# Patient Record
Sex: FEMALE | Race: WHITE | NOT HISPANIC OR LATINO | URBAN - NONMETROPOLITAN AREA
[De-identification: names, ages, dates, MRNs, and addresses within clinical notes are randomized per-mention and may not be internally consistent; named-entity substitution may affect disease eponyms.]

---

## 2017-01-18 NOTE — PATIENT DISCUSSION
11 16 16 LESS VA, MILD EDEMA, RECOM RETX OZURDEX GIVEN DROP IN VA, AND IOP IN 4 WKS, THEN REEVAL 2 MONTHS LATER

## 2017-01-18 NOTE — PATIENT DISCUSSION
REVIEWED OPTIINS A/L/E AND [facility] PREFERENCE FOR AVASTIN, AND PT WANTS TO PROCEED WITH LUCENTIS TREATMENT

## 2017-01-18 NOTE — PATIENT DISCUSSION
8 3 16 ^EDEMA, DECREASED VA 20/50 WITH NEAR CARD. REVIEWED OPTIONS SHORTER F/U VS TRIAL WITH OZURDEX. PT WANTS TO PROCEED WITH OZURDEX AND ACCEPTS RISK OF ^ IOP.

## 2017-01-18 NOTE — PATIENT DISCUSSION
Based on today’s exam, diagnostic studies and review of the records, the determination was made for OZURDEX 0.7MG treatment WITHIN 2-3 WEEKS

## 2017-03-08 NOTE — PATIENT DISCUSSION
OZURDEX - MILD EDEMA AT 3 MONTHS SP OZURDEX WITH REDUCTION IN [place] SINCE Valentino Sermons

## 2017-03-15 NOTE — PROCEDURE NOTE: CLINICAL
PROCEDURE NOTE: Intravitreal Ozurdex OD. Diagnosis: Branch Retinal Vein Occlusion with Macular Edema. Anesthesia: Subconjunctival. Prep: Betadine Flush. Prior to injection, risks/benefits/alternatives discussed including infection, loss of vision, hemorrhage, cataract, glaucoma, retinal tears or detachment and patient wished to proceed. The patient was seated in the examination chair. Topical anesthesia was induced with Proparicaine 0.5%. Subconjunctival xylocaine 2% approximately 0.5 cc was given for anesthesia. Betadine Prep was performed. The Ozurdex drug implant (dexamethasone 0.7 mg intravitreal implant) was injected into the vitreous cavity. The needle was passed 3.0 mm posterior to the limbus in pseudophakic patients, and 3.5 mm posterior to the limbus in phakic patients. The eye was stabilized using a q tip or cotton tip applicator, and the conjunctiva was displaced from the injection site. The remainder of the intravitreal Ozurdex in the single-use vial was then discarded in a medical waste disposal container. The implant settled inferiorly. The retina was examined following the injection. There was no evidence of hemorrhage, subretinal injection, or retinal detachment. Patient tolerated procedure well. There were no complications. Post-op instructions given. Lot # am. Expiration date: */*. Inventory Id: *. The patient was instructed of a follow up appointment in approximately 6 weeks for a limited exam and pressure check and was told to call immediately if the vision decreases and/or if the patient’s eye becomes red, painful, and/or light sensitive. If the patient is unable to reach the doctor within an hour or two, the patient is instructed to go to the emergency room or call 911. The patient was instructed to use Artificial Tears q.i.d. p.r.n for comfort. Can Vera

## 2017-05-31 NOTE — PATIENT DISCUSSION
11 16 16 LESS VA, MILD EDEMA, RECOM RETX OZURDEX GIVEN DROP IN VA, AND IOP IN 4 WKS, THEN REEVAL 2 MONTHS LATER.

## 2017-08-02 NOTE — PATIENT DISCUSSION
MILD EDEMA AT 5 MONTHS S/P LAST OZURDEX - NO PROGRESSION ON OCT OFF MEDS - TO FOLLOW AND SEE HOW SHE CONTINUES TO DO OFF MEDS.

## 2017-11-01 NOTE — PATIENT DISCUSSION
OZURDEX DONE 3 8 17 - MILD EDEMA AT 3 MONTHS SP OZURDEX WITH REDUCTION IN [address] SINCE JAN.

## 2017-11-01 NOTE — PATIENT DISCUSSION
MILD EDEMA - MILD INCREASE - NO REDUCTION IN VA - DISCUSSED WITH PT - TO HOLD ON TX AND REEVAL - IF FURTHER ^ EDEMA IT MAY BE NECESSARY TO [address] [place] [address].

## 2018-01-31 NOTE — PATIENT DISCUSSION
LAST OZURDEX  3 8 17 - MILD EDEMA AT 3 MONTHS SP OZURDEX WITH REDUCTION IN [place] SINCE JAN.

## 2018-12-05 PROBLEM — E11.3293: Noted: 2018-12-05

## 2018-12-05 PROBLEM — H16.223: Noted: 2020-12-10

## 2018-12-05 PROBLEM — E11.9: Noted: 2018-12-05

## 2019-09-18 NOTE — PATIENT DISCUSSION
LAST OZURDEX  3 8 17 - MILD EDEMA AT 3 MONTHS SP OZURDEX WITH REDUCTION IN [facility] SINCE JAN.

## 2019-09-18 NOTE — PROCEDURE NOTE: CLINICAL
PROCEDURE NOTE: Intravitreal Ozurdex OD. Diagnosis: Branch Retinal Vein Occlusion with Macular Edema. Anesthesia: Topical/Subconjunctival. Prep: Betadine Flush. Prior to injection, risks/benefits/alternatives discussed including infection, loss of vision, hemorrhage, cataract, glaucoma, retinal tears or detachment and patient wished to proceed. The patient was seated in the examination chair. Topical anesthesia was induced with Proparicaine 0.5%. Subconjunctival xylocaine 2% approximately 0.5 cc was given for anesthesia. Betadine Prep was performed. The Ozurdex drug implant (dexamethasone 0.7 mg intravitreal implant) was injected into the vitreous cavity. The needle was passed 3.0 mm posterior to the limbus in pseudophakic patients, and 3.5 mm posterior to the limbus in phakic patients. The eye was stabilized using a q tip or cotton tip applicator, and the conjunctiva was displaced from the injection site. The remainder of the intravitreal Ozurdex in the single-use vial was then discarded in a medical waste disposal container. The implant settled inferiorly. The retina was examined following the injection. There was no evidence of hemorrhage, subretinal injection, or retinal detachment. Patient tolerated procedure well. There were no complications. Post-op instructions given. Lot # *. Expiration date: */*. The patient was instructed of a follow up appointment in approximately 6 weeks for a limited exam and pressure check and was told to call immediately if the vision decreases and/or if the patient’s eye becomes red, painful, and/or light sensitive. If the patient is unable to reach the doctor within an hour or two, the patient is instructed to go to the emergency room or call 911. Inventory Id: *. The patient was instructed to use Artificial Tears q.i.d. p.r.n for comfort. Wilman Chavarria

## 2019-12-05 ENCOUNTER — IMPORTED ENCOUNTER (OUTPATIENT)
Dept: URBAN - NONMETROPOLITAN AREA CLINIC 1 | Facility: CLINIC | Age: 74
End: 2019-12-05

## 2019-12-05 PROCEDURE — 92014 COMPRE OPH EXAM EST PT 1/>: CPT

## 2019-12-05 NOTE — PATIENT DISCUSSION
Type II DM w/ mild Retinopathy-  discussed findings with pt -  mild diabetic retinopathy seen today -  pt diagnosed in 2005 condition is controlled with oral medication-  A1C checked in November number unknown-  BS-not every day last checked yesterday 137 fasting-  will send notes to Dr. Alejandro Mccauley-  continue to monitor yearly or prnP/C IOL w/ PCO OD-  discussed findings with pt -  PCO noted OD open OS-  no treatment indicated at this time patient is not having any symptoms -  continue to monitor; Dr's Notes: Amoron Quails

## 2019-12-18 NOTE — PROCEDURE NOTE: CLINICAL
PROCEDURE NOTE: Intravitreal Ozurdex OD. Diagnosis: Branch Retinal Vein Occlusion with Macular Edema. Anesthesia: Subconjunctival. Prep: Betadine Flush. Prior to injection, risks/benefits/alternatives discussed including infection, loss of vision, hemorrhage, cataract, glaucoma, retinal tears or detachment and patient wished to proceed. The patient was seated in the examination chair. Topical anesthesia was induced with Proparicaine 0.5%. Subconjunctival xylocaine 2% approximately 0.5 cc was given for anesthesia. Betadine Prep was performed. The Ozurdex drug implant (dexamethasone 0.7 mg intravitreal implant) was injected into the vitreous cavity. The needle was passed 3.0 mm posterior to the limbus in pseudophakic patients, and 3.5 mm posterior to the limbus in phakic patients. The eye was stabilized using a q tip or cotton tip applicator, and the conjunctiva was displaced from the injection site. The remainder of the intravitreal Ozurdex in the single-use vial was then discarded in a medical waste disposal container. The implant settled inferiorly. The retina was examined following the injection. There was no evidence of hemorrhage, subretinal injection, or retinal detachment. Patient tolerated procedure well. There were no complications. Post-op instructions given. Lot # *. Expiration date: */*. The patient was instructed of a follow up appointment in approximately 6 weeks for a limited exam and pressure check and was told to call immediately if the vision decreases and/or if the patient’s eye becomes red, painful, and/or light sensitive. If the patient is unable to reach the doctor within an hour or two, the patient is instructed to go to the emergency room or call 911. Inventory Id: *. The patient was instructed to use Artificial Tears q.i.d. p.r.n for comfort. Gaurav Wu

## 2019-12-23 NOTE — PROCEDURE NOTE: CLINICAL
PROCEDURE NOTE: Intravitreal Vancomycin/Ceftazidime OD. Diagnosis: Endophthalmitis. Anesthesia: Topical. Prep: Betadine Flush. Prior to injection, risks/benefits/alternatives discussed including infection, loss of vision, hemorrhage, cataract, retinal tears or detachment and patient wished to proceed. All questions asked by the patient were answered in detail. Betadine prep was performed. Intraocular Injection of Vancomycin (1.0 mg/0.1ml) and Ceftazidime (2.25/0.1ml) was given. Injection site: 3-4 mm from the limbus. Patient tolerated procedure well. There were no complications. Central retinal artery was perfused after injection. Post procedure antibiotics were placed on the eye. Post injection IOP = 10 mmHg. Post-op instructions given. Patient given office phone number/answering service number and advised to call immediately should there be an increase in floaters or redness, loss of vision or pain, or should they have any other questions or concerns. Harden Salon PROCEDURE NOTE: Subconjunctival Vancomycin OD. Diagnosis: Branch Retinal Vein Occlusion with Macular Edema. Anesthesia: Lidocaine 4%. Prior to procedure, risks/benefits/alternatives discussed including infection, loss of vision, hemorrhage, cataract, glaucoma, retinal tears or detachment. The patient wished to proceed with procedure. Subconjunctival injection of Vancomycin 20 [id number] was given. Location of injection: INFERIOR*. Time of injection: 1PM*. There were no complications. Patient tolerated procedure well. Post-op instructions given. Harden Salon PROCEDURE NOTE: Subconjunctival Ceftazidime OD. Diagnosis: Branch Retinal Vein Occlusion with Macular Edema. Anesthesia: Lidocaine 4%. Prior to procedure, risks/benefits/alternatives discussed including infection, loss of vision, hemorrhage, cataract, glaucoma, retinal tears or detachment. The patient wished to proceed with procedure. Subconjunctival injection of Ceftazidime 10MG * was given. Location of injection:INFERIOR *. Time of injection: 1PM*. There were no complications. Patient tolerated procedure well. Post-op instructions given. Harden Salon PROCEDURE NOTE: A/C Paracentesis, Diagnostic OD. Diagnosis: Endophthalmitis. Prior to procedure, risks/benefits/alternatives discussed including infection, loss of vision, hemorrhage, cataract, retinal tears or detachment and patient wished to proceed. All questions asked by the patient were answered in detail. Betadine prep was performed. Location of Paracentesis: Temporal Limbus. Volume of tap: 0.* ml. IOP after procedure was * mmHg. Patient tolerated procedure well. There were no complications. Post procedure instructions given. Patient given office phone number/answering service number and advised to call immediately should there be an increase in floaters or redness, loss of vision or pain, or should they have any other questions or concerns. Crista Lieberman

## 2019-12-23 NOTE — PROCEDURE NOTE: CLINICAL
PROCEDURE NOTE: Intravitreal Vancomycin/Ceftazidime OD. Diagnosis: Endophthalmitis. Anesthesia: Topical. Prep: Betadine Flush. Prior to injection, risks/benefits/alternatives discussed including infection, loss of vision, hemorrhage, cataract, retinal tears or detachment and patient wished to proceed. All questions asked by the patient were answered in detail. Betadine prep was performed. Intraocular Injection of Vancomycin (1.0 mg/0.1ml) and Ceftazidime (2.25/0.1ml) was given. Injection site: 3-4 mm from the limbus. Patient tolerated procedure well. There were no complications. Central retinal artery was perfused after injection. Post procedure antibiotics were placed on the eye. Post injection IOP = 10 mmHg. Post-op instructions given. Patient given office phone number/answering service number and advised to call immediately should there be an increase in floaters or redness, loss of vision or pain, or should they have any other questions or concerns. Harden Salon PROCEDURE NOTE: Subconjunctival Vancomycin OD. Diagnosis: Branch Retinal Vein Occlusion with Macular Edema. Anesthesia: Lidocaine 4%. Prior to procedure, risks/benefits/alternatives discussed including infection, loss of vision, hemorrhage, cataract, glaucoma, retinal tears or detachment. The patient wished to proceed with procedure. Subconjunctival injection of Vancomycin 20 [id number] was given. Location of injection: INFERIOR*. Time of injection: 1PM*. There were no complications. Patient tolerated procedure well. Post-op instructions given. Harden Salon PROCEDURE NOTE: Subconjunctival Ceftazidime OD. Diagnosis: Branch Retinal Vein Occlusion with Macular Edema. Anesthesia: Lidocaine 4%. Prior to procedure, risks/benefits/alternatives discussed including infection, loss of vision, hemorrhage, cataract, glaucoma, retinal tears or detachment. The patient wished to proceed with procedure. Subconjunctival injection of Ceftazidime 10MG * was given. Location of injection:INFERIOR *. Time of injection: 1PM*. There were no complications. Patient tolerated procedure well. Post-op instructions given. Harden Salon PROCEDURE NOTE: A/C Paracentesis, Diagnostic OD. Diagnosis: Endophthalmitis. Prior to procedure, risks/benefits/alternatives discussed including infection, loss of vision, hemorrhage, cataract, retinal tears or detachment and patient wished to proceed. All questions asked by the patient were answered in detail. Betadine prep was performed. Location of Paracentesis: Temporal Limbus. Volume of tap: 0.* ml. IOP after procedure was * mmHg. Patient tolerated procedure well. There were no complications. Post procedure instructions given. Patient given office phone number/answering service number and advised to call immediately should there be an increase in floaters or redness, loss of vision or pain, or should they have any other questions or concerns. Crista Lieberman

## 2019-12-23 NOTE — PROCEDURE NOTE: CLINICAL
PROCEDURE NOTE: Intravitreal Vancomycin/Ceftazidime OD. Diagnosis: Endophthalmitis. Anesthesia: Topical. Prep: Betadine Flush. Prior to injection, risks/benefits/alternatives discussed including infection, loss of vision, hemorrhage, cataract, retinal tears or detachment and patient wished to proceed. All questions asked by the patient were answered in detail. Betadine prep was performed. Intraocular Injection of Vancomycin (1.0 mg/0.1ml) and Ceftazidime (2.25/0.1ml) was given. Injection site: 3-4 mm from the limbus. Patient tolerated procedure well. There were no complications. Central retinal artery was perfused after injection. Post procedure antibiotics were placed on the eye. Post injection IOP = 10 mmHg. Post-op instructions given. Patient given office phone number/answering service number and advised to call immediately should there be an increase in floaters or redness, loss of vision or pain, or should they have any other questions or concerns. Harden Salon PROCEDURE NOTE: Subconjunctival Vancomycin OD. Diagnosis: Branch Retinal Vein Occlusion with Macular Edema. Anesthesia: Lidocaine 4%. Prior to procedure, risks/benefits/alternatives discussed including infection, loss of vision, hemorrhage, cataract, glaucoma, retinal tears or detachment. The patient wished to proceed with procedure. Subconjunctival injection of Vancomycin 20 [id number] was given. Location of injection: INFERIOR*. Time of injection: 1PM*. There were no complications. Patient tolerated procedure well. Post-op instructions given. Harden Salon PROCEDURE NOTE: Subconjunctival Ceftazidime OD. Diagnosis: Branch Retinal Vein Occlusion with Macular Edema. Anesthesia: Lidocaine 4%. Prior to procedure, risks/benefits/alternatives discussed including infection, loss of vision, hemorrhage, cataract, glaucoma, retinal tears or detachment. The patient wished to proceed with procedure. Subconjunctival injection of Ceftazidime 10MG * was given. Location of injection:INFERIOR *. Time of injection: 1PM*. There were no complications. Patient tolerated procedure well. Post-op instructions given. Harden Salon PROCEDURE NOTE: A/C Paracentesis, Diagnostic OD. Diagnosis: Endophthalmitis. Prior to procedure, risks/benefits/alternatives discussed including infection, loss of vision, hemorrhage, cataract, retinal tears or detachment and patient wished to proceed. All questions asked by the patient were answered in detail. Betadine prep was performed. Location of Paracentesis: Temporal Limbus. Volume of tap: 0.* ml. IOP after procedure was * mmHg. Patient tolerated procedure well. There were no complications. Post procedure instructions given. Patient given office phone number/answering service number and advised to call immediately should there be an increase in floaters or redness, loss of vision or pain, or should they have any other questions or concerns. Crista Lieberman

## 2020-01-02 NOTE — PATIENT DISCUSSION
CONTINUES TO IMPROVE 1 2 2020 - CULT NEG - AC CLEAR - STILL VIT CELLS - CONT PF QID AND RE-EVAL IN 2 WKS.

## 2020-03-05 NOTE — PATIENT DISCUSSION
REVIEWED OPTIONS - TX VS OBSERVATION - GIVEN RXN AFTER LAST OZURDEX PT NERVOUS - VA 20/25 - SHE WANTS TO WAIT AND SEE HOW IT DOES - TO REEVAL IN 1 MONTH AND TO GET APPROVAL FOR AVASTIN TX WHEN SHE COMES BACK.

## 2020-04-30 NOTE — PATIENT DISCUSSION
DUREZOL TID - MOD EDEMA - INCREASING - PT WORIED ABOUT OZURDEX GIVEN REAXN SHE HAD - TO SEE HOW [address]

## 2020-05-14 NOTE — PATIENT DISCUSSION
DUREZOL TID - MOD EDEMA - INCREASING - PT WORIED ABOUT OZURDEX GIVEN REAXN SHE HAD - TO SEE HOW [address]

## 2020-06-24 NOTE — PROCEDURE NOTE: CLINICAL
PROCEDURE NOTE: Avastin () OD. Diagnosis: Branch Retinal Vein Occlusion with Macular Edema. Anesthesia: Lidocaine 4%. Prep: Betadine Drops. Prior to injection, risks/benefits/alternatives discussed including infection, loss of vision, hemorrhage, cataract, glaucoma, retinal tears or detachment. The off-label status of Intravitreal Avastin also was reviewed. The patient wished to proceed with treatment. Topical anesthesia was induced with Alcaine. Additional anesthesia was achieved using drop(s) or injection checked above. a drop of Povidone-iodine 5% ophthalmic solution was instilled over the injection site and in the inferior fornix. Betadine prep was performed. Using the syringe provided, Avastin 1.25 mg in 0.05 cc was injected into the vitreous cavity. The needle was passed 3.0 mm posterior to the limbus in pseudophakic patients, and 3.5 mm posterior to the lumbus in phakic patients. The remainder of the Avastin in the single-use vial was then discarded in a medical waste disposal container. Unused medication was discarded. Patient tolerated procedure well. There were no complications. Injection time: 4:57PM. Post-op instructions given. Expiration Date: 09-. The patient was instructed to return for re-evaluation in approximately 4-12 weeks depending on his/her condition and was told to call immediately if vision decreases and/or if his/her eye becomes red, painful, and/or light sensitive. The patient was instructed to go to the emergency room or call 911 if unable to reach the doctor within an hour or two of trying or calling. The patient was instructed to use Artificial Tears q.i.d. p.r.n for comfort. Crista Lieberman

## 2020-06-24 NOTE — PATIENT DISCUSSION
DUREZOL TID - MOD EDEMA - INCREASING - PT WORIED ABOUT OZURDEX GIVEN REAXN SHE HAD - TO SEE HOW [address]

## 2020-07-29 NOTE — PROCEDURE NOTE: CLINICAL
PROCEDURE NOTE: Avastin () OD. Diagnosis: Branch Retinal Vein Occlusion with Macular Edema. Anesthesia: Subconjunctival. Prep: Betadine Drops. Prior to injection, risks/benefits/alternatives discussed including infection, loss of vision, hemorrhage, cataract, glaucoma, retinal tears or detachment. The off-label status of Intravitreal Avastin also was reviewed. The patient wished to proceed with treatment. Topical anesthesia was induced with Alcaine. Additional anesthesia was achieved using drop(s) or injection checked above. a drop of Povidone-iodine 5% ophthalmic solution was instilled over the injection site and in the inferior fornix. Betadine prep was performed. Using the syringe provided, Avastin 1.25 mg in 0.05 cc was injected into the vitreous cavity. The needle was passed 3.0 mm posterior to the limbus in pseudophakic patients, and 3.5 mm posterior to the lumbus in phakic patients. The remainder of the Avastin in the single-use vial was then discarded in a medical waste disposal container. Unused medication was discarded. Patient tolerated procedure well. There were no complications. Injection time: 3:45P. M. Post-op instructions given. Expiration Date: 10/13/2020. The patient was instructed to return for re-evaluation in approximately 4-12 weeks depending on his/her condition and was told to call immediately if vision decreases and/or if his/her eye becomes red, painful, and/or light sensitive. The patient was instructed to go to the emergency room or call 911 if unable to reach the doctor within an hour or two of trying or calling. The patient was instructed to use Artificial Tears q.i.d. p.r.n for comfort. Debbie Crespo

## 2020-07-29 NOTE — PATIENT DISCUSSION
DUREZOL TID - MOD EDEMA - INCREASING - PT WORIED ABOUT OZURDEX GIVEN REAXN SHE HAD - TO SEE HOW [address]

## 2020-09-02 NOTE — PATIENT DISCUSSION
9 2 20 EYLEA AND REPEAT EVERY 5 WKS X 2 - MILD TO MOD EDEMA AT 5 WKS ON AVASTIN - RECOM CHANGING TO EYLEA TO SEE IF IT WORKS BETTER - AVASTIN IS NOT CLEARING THE EDEMA.

## 2020-09-02 NOTE — PATIENT DISCUSSION
DUREZOL TID - MOD EDEMA - INCREASING - PT WORIED ABOUT OZURDEX GIVEN REAXN SHE HAD - TO SEE HOW [address]

## 2020-09-03 NOTE — PATIENT DISCUSSION
DUREZOL TID - MOD EDEMA - INCREASING - PT WORIED ABOUT OZURDEX GIVEN REAXN SHE HAD - TO SEE HOW [address]

## 2020-09-03 NOTE — PROCEDURE NOTE: CLINICAL
PROCEDURE NOTE: Eylea PFS #1 OD. Diagnosis: Branch Retinal Vein Occlusion with Macular Edema. Anesthesia: Subconjunctival. Prep: Betadine Drops. Prior to injection, risks/benefits/alternatives discussed including but not limited to infection, loss of vision or eye, hemorrhage, cataract, glaucoma, retinal tears or detachment. The patient wished to proceed with treatment. Betadine prep was performed. Topical anesthesia was induced with Alcaine. Additional anesthesia was achieved using drop(s) or injection checked above. A drop of Povidone-iodine 5% ophthalmic solution was instilled over the injection site and in the inferior fornix. A single use prefilled syringe of intravitreal Eylea 2mg/0.05ml was used and excess was disposed of as waste. The needle was passed 3.0 mm posterior to the limbus in pseudophakic patients, and 3.5 mm posterior to the limbus in phakic patients. Injection time: 2:45 P.M. Patient tolerated procedure well. There were no complications. The eye was irrigated with sterile irrigating solution. Post procedure instructions given. The patient was instructed to use Artificial Tears q.i.d. p.r.n for comfort. The patient was instructed to return for re-evaluation in approximately 4-12 weeks depending on his/her condition and was told to call immediately if vision decreases and/or if his/her eye becomes red, painful, and/or light sensitive. The patient was instructed to go to the emergency room or call 911 if unable to reach the doctor within an hour or two of trying or calling. Wilman Chavarria

## 2020-09-15 NOTE — PATIENT DISCUSSION
DECREASED VA PER 2 DAYS, NO PAIN. Post-Care Instructions: I reviewed with the patient in detail post-care instructions. Patient is to wear sunprotection, and avoid picking at any of the treated lesions. Pt may apply Vaseline to crusted or scabbing areas. Number Of Freeze-Thaw Cycles: 2 freeze-thaw cycles Render Post-Care Instructions In Note?: no Detail Level: Simple Consent: The patient's consent was obtained including but not limited to risks of crusting, scabbing, blistering, scarring, darker or lighter pigmentary change, recurrence, incomplete removal and infection. Duration Of Freeze Thaw-Cycle (Seconds): 3

## 2020-10-07 NOTE — PROCEDURE NOTE: CLINICAL
PROCEDURE NOTE: Eylea PFS OD. Diagnosis: Branch Retinal Vein Occlusion with Macular Edema. Prior to injection, risks/benefits/alternatives discussed including but not limited to infection, loss of vision or eye, hemorrhage, cataract, glaucoma, retinal tears or detachment. The patient wished to proceed with treatment. Betadine prep was performed. Topical anesthesia was induced with Alcaine. Additional anesthesia was achieved using drop(s) or injection checked above. A drop of Povidone-iodine 5% ophthalmic solution was instilled over the injection site and in the inferior fornix. A single use prefilled syringe of intravitreal Eylea 2mg/0.05ml was used and excess was disposed of as waste. The needle was passed 3.0 mm posterior to the limbus in pseudophakic patients, and 3.5 mm posterior to the limbus in phakic patients. Injection time: 2:15 PM.  Patient tolerated procedure well. There were no complications. The eye was irrigated with sterile irrigating solution. Post procedure instructions given. The patient was instructed to use Artificial Tears q.i.d. p.r.n for comfort. The patient was instructed to return for re-evaluation in approximately 4-12 weeks depending on his/her condition and was told to call immediately if vision decreases and/or if his/her eye becomes red, painful, and/or light sensitive. The patient was instructed to go to the emergency room or call 911 if unable to reach the doctor within an hour or two of trying or calling. Wilman Chavarria

## 2020-10-07 NOTE — PATIENT DISCUSSION
DUREZOL TID - MOD EDEMA - INCREASING - PT WORIED ABOUT OZURDEX GIVEN REAXN SHE HAD - TO SEE HOW [address].

## 2020-11-11 NOTE — PROCEDURE NOTE: CLINICAL
PROCEDURE NOTE: Eylea PFS OD. Diagnosis: Branch Retinal Vein Occlusion with Macular Edema. Anesthesia: Subconjunctival. Prep: Betadine Drops. Prior to injection, risks/benefits/alternatives discussed including but not limited to infection, loss of vision or eye, hemorrhage, cataract, glaucoma, retinal tears or detachment. The patient wished to proceed with treatment. Betadine prep was performed. Topical anesthesia was induced with Alcaine. Additional anesthesia was achieved using drop(s) or injection checked above. A drop of Povidone-iodine 5% ophthalmic solution was instilled over the injection site and in the inferior fornix. A single use prefilled syringe of intravitreal Eylea 2mg/0.05ml was used and excess was disposed of as waste. The needle was passed 3.0 mm posterior to the limbus in pseudophakic patients, and 3.5 mm posterior to the limbus in phakic patients. Injection time: 4:25 PM.  Patient tolerated procedure well. There were no complications. The eye was irrigated with sterile irrigating solution. Post procedure instructions given. The patient was instructed to use Artificial Tears q.i.d. p.r.n for comfort. The patient was instructed to return for re-evaluation in approximately 4-12 weeks depending on his/her condition and was told to call immediately if vision decreases and/or if his/her eye becomes red, painful, and/or light sensitive. The patient was instructed to go to the emergency room or call 911 if unable to reach the doctor within an hour or two of trying or calling. Shivam Meade

## 2020-11-11 NOTE — PATIENT DISCUSSION
DUREZOL TID - MOD EDEMA - INCREASING - PT WORIED ABOUT OZURDEX GIVEN REAXN SHE HAD - TO SEE HOW [address]

## 2020-12-10 ENCOUNTER — IMPORTED ENCOUNTER (OUTPATIENT)
Dept: URBAN - NONMETROPOLITAN AREA CLINIC 1 | Facility: CLINIC | Age: 75
End: 2020-12-10

## 2020-12-10 PROCEDURE — 99214 OFFICE O/P EST MOD 30 MIN: CPT

## 2020-12-10 NOTE — PATIENT DISCUSSION
Type II DM w/ mild Retinopathy-  discussed findings with pt -  mild diabetic retinopathy noted OU-  pt diagnosed in 2005 condition is controlled with oral medication-  A1C checked until next week number unknown-  BS-not every day last checked every other day 147 fasting-  will send notes to Dr. Sis Lynn-  continue to monitor yearly or prnMild ARMD OU-  discussed findings /patient-  no changes noted at this time-  need to establish baseline testing at next visit-  RTC 1 year w/OCT Mac or prnP/C IOL w/ PCO OD-  discussed findings with pt -  PCO noted OD open OS-  no treatment indicated at this time patient is not having any symptoms -  continue to monitor year or prnDES OU-  discussed findings w/patient-  no changes noted at this time-  continue AT's at least BID OU-  RTC 1 year or prn; Betzy Notes: MR Deferred 12/10/2020DFE 12/10/2020

## 2020-12-16 NOTE — PATIENT DISCUSSION
DUREZOL TID - MOD EDEMA - INCREASING - PT WORIED ABOUT OZURDEX GIVEN REAXN SHE HAD - TO SEE HOW [address]

## 2020-12-16 NOTE — PROCEDURE NOTE: CLINICAL
PROCEDURE NOTE: Eylea PFS OD. Diagnosis: Branch Retinal Vein Occlusion with Macular Edema. Anesthesia: Subconjunctival. Prep: Betadine Drops. Prior to injection, risks/benefits/alternatives discussed including but not limited to infection, loss of vision or eye, hemorrhage, cataract, glaucoma, retinal tears or detachment. The patient wished to proceed with treatment. Betadine prep was performed. Topical anesthesia was induced with Alcaine. Additional anesthesia was achieved using drop(s) or injection checked above. A drop of Povidone-iodine 5% ophthalmic solution was instilled over the injection site and in the inferior fornix. A single use prefilled syringe of intravitreal Eylea 2mg/0.05ml was used and excess was disposed of as waste. The needle was passed 3.0 mm posterior to the limbus in pseudophakic patients, and 3.5 mm posterior to the limbus in phakic patients. Injection time: 5:30 PM.  Patient tolerated procedure well. There were no complications. The eye was irrigated with sterile irrigating solution. Post procedure instructions given. The patient was instructed to use Artificial Tears q.i.d. p.r.n for comfort. The patient was instructed to return for re-evaluation in approximately 4-12 weeks depending on his/her condition and was told to call immediately if vision decreases and/or if his/her eye becomes red, painful, and/or light sensitive. The patient was instructed to go to the emergency room or call 911 if unable to reach the doctor within an hour or two of trying or calling. Chaz Panchal

## 2021-01-21 NOTE — PATIENT DISCUSSION
DUREZOL TID - MOD EDEMA - INCREASING - PT WORIED ABOUT OZURDEX GIVEN REAXN SHE HAD - TO SEE HOW [address]

## 2021-01-21 NOTE — PROCEDURE NOTE: CLINICAL
PROCEDURE NOTE: Eylea PFS OD. Diagnosis: Branch Retinal Vein Occlusion with Macular Edema. Anesthesia: Subconjunctival. Prep: Betadine Drops. Prior to injection, risks/benefits/alternatives discussed including but not limited to infection, loss of vision or eye, hemorrhage, cataract, glaucoma, retinal tears or detachment. The patient wished to proceed with treatment. Betadine prep was performed. Topical anesthesia was induced with Alcaine. Additional anesthesia was achieved using drop(s) or injection checked above. A drop of Povidone-iodine 5% ophthalmic solution was instilled over the injection site and in the inferior fornix. A single use prefilled syringe of intravitreal Eylea 2mg/0.05ml was used and excess was disposed of as waste. The needle was passed 3.0 mm posterior to the limbus in pseudophakic patients, and 3.5 mm posterior to the limbus in phakic patients. Injection time: 2:45 PM.  Patient tolerated procedure well. There were no complications. The eye was irrigated with sterile irrigating solution. Post procedure instructions given. The patient was instructed to use Artificial Tears q.i.d. p.r.n for comfort. The patient was instructed to return for re-evaluation in approximately 4-12 weeks depending on his/her condition and was told to call immediately if vision decreases and/or if his/her eye becomes red, painful, and/or light sensitive. The patient was instructed to go to the emergency room or call 911 if unable to reach the doctor within an hour or two of trying or calling. Spencer Arguelles

## 2021-02-25 NOTE — PATIENT DISCUSSION
DUREZOL TID - MOD EDEMA - INCREASING - PT WORIED ABOUT OZURDEX GIVEN REAXN SHE HAD - TO SEE HOW [address]

## 2021-02-25 NOTE — PROCEDURE NOTE: CLINICAL
PROCEDURE NOTE: Eylea PFS OD. Diagnosis: Branch Retinal Vein Occlusion with Macular Edema. Anesthesia: Subconjunctival. Prep: Betadine Drops. Prior to injection, risks/benefits/alternatives discussed including but not limited to infection, loss of vision or eye, hemorrhage, cataract, glaucoma, retinal tears or detachment. The patient wished to proceed with treatment. Betadine prep was performed. Topical anesthesia was induced with Alcaine. Additional anesthesia was achieved using drop(s) or injection checked above. A drop of Povidone-iodine 5% ophthalmic solution was instilled over the injection site and in the inferior fornix. A single use prefilled syringe of intravitreal Eylea 2mg/0.05ml was used and excess was disposed of as waste. The needle was passed 3.0 mm posterior to the limbus in pseudophakic patients, and 3.5 mm posterior to the limbus in phakic patients. Injection time: 217PM.  Patient tolerated procedure well. There were no complications. The eye was irrigated with sterile irrigating solution. Post procedure instructions given. The patient was instructed to use Artificial Tears q.i.d. p.r.n for comfort. The patient was instructed to return for re-evaluation in approximately 4-12 weeks depending on his/her condition and was told to call immediately if vision decreases and/or if his/her eye becomes red, painful, and/or light sensitive. The patient was instructed to go to the emergency room or call 911 if unable to reach the doctor within an hour or two of trying or calling. KEIRA GTT 2/25/2021.

## 2021-03-31 NOTE — PROCEDURE NOTE: CLINICAL
PROCEDURE NOTE: Eylea PFS OD. Diagnosis: Branch Retinal Vein Occlusion with Macular Edema. Anesthesia: Lidocaine 4%. Prep: Betadine Drops. Prior to injection, risks/benefits/alternatives discussed including but not limited to infection, loss of vision or eye, hemorrhage, cataract, glaucoma, retinal tears or detachment. The patient wished to proceed with treatment. Betadine prep was performed. Topical anesthesia was induced with Alcaine. Additional anesthesia was achieved using drop(s) or injection checked above. A drop of Povidone-iodine 5% ophthalmic solution was instilled over the injection site and in the inferior fornix. A single use prefilled syringe of intravitreal Eylea 2mg/0.05ml was used and excess was disposed of as waste. The needle was passed 3.0 mm posterior to the limbus in pseudophakic patients, and 3.5 mm posterior to the limbus in phakic patients. Injection time: 2:15PM.  Patient tolerated procedure well. There were no complications. The eye was irrigated with sterile irrigating solution. Post procedure instructions given. The patient was instructed to use Artificial Tears q.i.d. p.r.n for comfort. The patient was instructed to return for re-evaluation in approximately 4-12 weeks depending on his/her condition and was told to call immediately if vision decreases and/or if his/her eye becomes red, painful, and/or light sensitive. The patient was instructed to go to the emergency room or call 911 if unable to reach the doctor within an hour or two of trying or calling. INVELTYS GIVEN PER SEBAS. Modesto Paul

## 2021-03-31 NOTE — PATIENT DISCUSSION
DUREZOL TID - MOD EDEMA - INCREASING - PT WORIED ABOUT OZURDEX GIVEN REAXN SHE HAD - TO SEE HOW [address]

## 2021-05-05 NOTE — PROCEDURE NOTE: CLINICAL
PROCEDURE NOTE: Eylea PFS OD. Diagnosis: Branch Retinal Vein Occlusion with Macular Edema. Anesthesia: Subconjunctival. Prep: Betadine Drops. Prior to injection, risks/benefits/alternatives discussed including but not limited to infection, loss of vision or eye, hemorrhage, cataract, glaucoma, retinal tears or detachment. The patient wished to proceed with treatment. Betadine prep was performed. Topical anesthesia was induced with Alcaine. Additional anesthesia was achieved using drop(s) or injection checked above. A drop of Povidone-iodine 5% ophthalmic solution was instilled over the injection site and in the inferior fornix. A single use prefilled syringe of intravitreal Eylea 2mg/0.05ml was used and excess was disposed of as waste. The needle was passed 3.0 mm posterior to the limbus in pseudophakic patients, and 3.5 mm posterior to the limbus in phakic patients. Injection time: 330. Patient tolerated procedure well. There were no complications. The eye was irrigated with sterile irrigating solution. Post procedure instructions given. The patient was instructed to use Artificial Tears q.i.d. p.r.n for comfort. The patient was instructed to return for re-evaluation in approximately 4-12 weeks depending on his/her condition and was told to call immediately if vision decreases and/or if his/her eye becomes red, painful, and/or light sensitive. The patient was instructed to go to the emergency room or call 911 if unable to reach the doctor within an hour or two of trying or calling. Jana Martino

## 2021-05-05 NOTE — PATIENT DISCUSSION
DUREZOL TID - MOD EDEMA - INCREASING - PT WORIED ABOUT OZURDEX GIVEN REAXN SHE HAD - TO SEE HOW [address]

## 2021-06-10 NOTE — PATIENT DISCUSSION
Based on today's exam, diagnostic studies, and review of records, the determination was made for treatment TODAY. AVASTIN 1.25MG recommended TODAY after discussion of benefits, risks and alternatives. The injection was given without complication and tolerated well by the patient. Discussed risks, benefits, and alternatives of Intravitreal Avastin including off label use. Post-injection instructions were reviewed and understood by the patient. Procedure was performed without complications. Instructed to call immediately if any new distortion, blurring, decreased vision or eye pain. Continue PRN treatment. Quality 130: Documentation Of Current Medications In The Medical Record: Current Medications Documented Detail Level: Detailed

## 2021-06-10 NOTE — PROCEDURE NOTE: CLINICAL
PROCEDURE NOTE: Eylea PFS OD. Diagnosis: Branch Retinal Vein Occlusion with Macular Edema. Anesthesia: Topical/Subconjunctival. Prep: Betadine Drops. Prior to injection, risks/benefits/alternatives discussed including but not limited to infection, loss of vision or eye, hemorrhage, cataract, glaucoma, retinal tears or detachment. The patient wished to proceed with treatment. Betadine prep was performed. Topical anesthesia was induced with Alcaine. Additional anesthesia was achieved using drop(s) or injection checked above. A drop of Povidone-iodine 5% ophthalmic solution was instilled over the injection site and in the inferior fornix. A single use prefilled syringe of intravitreal Eylea 2mg/0.05ml was used and excess was disposed of as waste. The needle was passed 3.0 mm posterior to the limbus in pseudophakic patients, and 3.5 mm posterior to the limbus in phakic patients. Injection time: 2:20 PM.  Patient tolerated procedure well. There were no complications. The eye was irrigated with sterile irrigating solution. Post procedure instructions given. The patient was instructed to use Artificial Tears q.i.d. p.r.n for comfort. The patient was instructed to return for re-evaluation in approximately 4-12 weeks depending on his/her condition and was told to call immediately if vision decreases and/or if his/her eye becomes red, painful, and/or light sensitive. The patient was instructed to go to the emergency room or call 911 if unable to reach the doctor within an hour or two of trying or calling. Shelbie Suarez

## 2021-06-10 NOTE — PATIENT DISCUSSION
DUREZOL TID - MOD EDEMA - INCREASING - PT WORIED ABOUT OZURDEX GIVEN REAXN SHE HAD - TO SEE HOW [address]

## 2021-07-21 NOTE — PROCEDURE NOTE: CLINICAL
PROCEDURE NOTE: Eylea PFS OD. Diagnosis: Branch Retinal Vein Occlusion with Macular Edema. Anesthesia: Subconjunctival. Prep: Betadine Drops. Prior to injection, risks/benefits/alternatives discussed including but not limited to infection, loss of vision or eye, hemorrhage, cataract, glaucoma, retinal tears or detachment. The patient wished to proceed with treatment. Betadine prep was performed. Topical anesthesia was induced with Alcaine. Additional anesthesia was achieved using drop(s) or injection checked above. A drop of Povidone-iodine 5% ophthalmic solution was instilled over the injection site and in the inferior fornix. A single use prefilled syringe of intravitreal Eylea 2mg/0.05ml was used and excess was disposed of as waste. The needle was passed 3.0 mm posterior to the limbus in pseudophakic patients, and 3.5 mm posterior to the limbus in phakic patients. Injection time: 2:45 PM.  Patient tolerated procedure well. There were no complications. The eye was irrigated with sterile irrigating solution. Post procedure instructions given. The patient was instructed to use Artificial Tears q.i.d. p.r.n for comfort. The patient was instructed to return for re-evaluation in approximately 4-12 weeks depending on his/her condition and was told to call immediately if vision decreases and/or if his/her eye becomes red, painful, and/or light sensitive. The patient was instructed to go to the emergency room or call 911 if unable to reach the doctor within an hour or two of trying or calling. INVELTYS GIVEN PRIOR TO INJECTION, PER SEBAS. Xochilt Grace

## 2021-08-25 NOTE — PATIENT DISCUSSION
Based on today's exam, diagnostic studies, and review of records, the determination was made for treatment today. EYLEA 2mg recommended today after discussion of benefits, risks and alternatives. The injection was given without complication and tolerated well by the patient. Post-injection instructions were reviewed and understood by the patient. Procedure was performed without complications. Instructed to call immediately if any new distortion, blurring, decreased vision or eye pain.

## 2021-08-25 NOTE — PATIENT DISCUSSION
DUREZOL TID - MOD EDEMA - INCREASING - PT WORIED ABOUT OZURDEX GIVEN REAXN SHE HAD - TO SEE HOW [address]

## 2021-08-25 NOTE — PROCEDURE NOTE: CLINICAL
PROCEDURE NOTE: Eylea PFS OD. Diagnosis: Branch Retinal Vein Occlusion with Macular Edema. Anesthesia: Topical/Subconjunctival. Prep: Betadine Drops. Prior to injection, risks/benefits/alternatives discussed including but not limited to infection, loss of vision or eye, hemorrhage, cataract, glaucoma, retinal tears or detachment. The patient wished to proceed with treatment. Betadine prep was performed. Topical anesthesia was induced with Alcaine. Additional anesthesia was achieved using drop(s) or injection checked above. A drop of Povidone-iodine 5% ophthalmic solution was instilled over the injection site and in the inferior fornix. A single use prefilled syringe of intravitreal Eylea 2mg/0.05ml was used and excess was disposed of as waste. The needle was passed 3.0 mm posterior to the limbus in pseudophakic patients, and 3.5 mm posterior to the limbus in phakic patients. Injection time: 04:25PM.  Patient tolerated procedure well. There were no complications. The eye was irrigated with sterile irrigating solution. Post procedure instructions given. The patient was instructed to use Artificial Tears q.i.d. p.r.n for comfort. The patient was instructed to return for re-evaluation in approximately 4-12 weeks depending on his/her condition and was told to call immediately if vision decreases and/or if his/her eye becomes red, painful, and/or light sensitive. The patient was instructed to go to the emergency room or call 911 if unable to reach the doctor within an hour or two of trying or calling. Shayne Collazo

## 2021-09-30 NOTE — PROCEDURE NOTE: CLINICAL
PROCEDURE NOTE: Eylea PFS OD. Diagnosis: Branch Retinal Vein Occlusion with Macular Edema. Anesthesia: Subconjunctival. Prep: Betadine Drops. Prior to injection, risks/benefits/alternatives discussed including but not limited to infection, loss of vision or eye, hemorrhage, cataract, glaucoma, retinal tears or detachment. The patient wished to proceed with treatment. Betadine prep was performed. Topical anesthesia was induced with Alcaine. Additional anesthesia was achieved using drop(s) or injection checked above. A drop of Povidone-iodine 5% ophthalmic solution was instilled over the injection site and in the inferior fornix. A single use prefilled syringe of intravitreal Eylea 2mg/0.05ml was used and excess was disposed of as waste. The needle was passed 3.0 mm posterior to the limbus in pseudophakic patients, and 3.5 mm posterior to the limbus in phakic patients. Injection time: 3:00PM.  Patient tolerated procedure well. There were no complications. The eye was irrigated with sterile irrigating solution. Post procedure instructions given. The patient was instructed to use Artificial Tears q.i.d. p.r.n for comfort. The patient was instructed to return for re-evaluation in approximately 4-12 weeks depending on his/her condition and was told to call immediately if vision decreases and/or if his/her eye becomes red, painful, and/or light sensitive. The patient was instructed to go to the emergency room or call 911 if unable to reach the doctor within an hour or two of trying or calling. INVELTYS GIVEN.

## 2021-09-30 NOTE — PATIENT DISCUSSION
DUREZOL TID - MOD EDEMA - INCREASING - PT WORIED ABOUT OZURDEX GIVEN REAXN SHE HAD - TO SEE HOW [address].

## 2021-11-04 NOTE — PATIENT DISCUSSION
DUREZOL TID - MOD EDEMA - INCREASING - PT WORIED ABOUT OZURDEX GIVEN REAXN SHE HAD - TO SEE HOW [address].

## 2021-11-04 NOTE — PROCEDURE NOTE: CLINICAL
PROCEDURE NOTE: Eylea PFS OD. Diagnosis: Branch Retinal Vein Occlusion with Macular Edema. Anesthesia: Subconjunctival. Prep: Betadine Drops. Prior to injection, risks/benefits/alternatives discussed including but not limited to infection, loss of vision or eye, hemorrhage, cataract, glaucoma, retinal tears or detachment. The patient wished to proceed with treatment. Betadine prep was performed. Topical anesthesia was induced with Alcaine. Additional anesthesia was achieved using drop(s) or injection checked above. A drop of Povidone-iodine 5% ophthalmic solution was instilled over the injection site and in the inferior fornix. A single use prefilled syringe of intravitreal Eylea 2mg/0.05ml was used and excess was disposed of as waste. The needle was passed 3.0 mm posterior to the limbus in pseudophakic patients, and 3.5 mm posterior to the limbus in phakic patients. Injection time: 3:15 PM.  Patient tolerated procedure well. There were no complications. The eye was irrigated with sterile irrigating solution. Post procedure instructions given. The patient was instructed to use Artificial Tears q.i.d. p.r.n for comfort. The patient was instructed to return for re-evaluation in approximately 4-12 weeks depending on his/her condition and was told to call immediately if vision decreases and/or if his/her eye becomes red, painful, and/or light sensitive. The patient was instructed to go to the emergency room or call 911 if unable to reach the doctor within an hour or two of trying or calling. Carla Stone

## 2021-12-08 NOTE — PATIENT DISCUSSION
12/24/19 HYPOPYON IMPROVED, VIEW IMPROVED,. 

## 2021-12-09 NOTE — PATIENT DISCUSSION
DUREZOL TID - MOD EDEMA - INCREASING - PT WORIED ABOUT OZURDEX GIVEN REAXN SHE HAD - TO SEE HOW [address]

## 2021-12-09 NOTE — PROCEDURE NOTE: CLINICAL
PROCEDURE NOTE: Eylea PFS OD. Diagnosis: Branch Retinal Vein Occlusion with Macular Edema. Anesthesia: Lidocaine 4%. Prep: Betadine Drops. Prior to injection, risks/benefits/alternatives discussed including but not limited to infection, loss of vision or eye, hemorrhage, cataract, glaucoma, retinal tears or detachment. The patient wished to proceed with treatment. Betadine prep was performed. Topical anesthesia was induced with Alcaine. Additional anesthesia was achieved using drop(s) or injection checked above. A drop of Povidone-iodine 5% ophthalmic solution was instilled over the injection site and in the inferior fornix. A single use prefilled syringe of intravitreal Eylea 2mg/0.05ml was used and excess was disposed of as waste. The needle was passed 3.0 mm posterior to the limbus in pseudophakic patients, and 3.5 mm posterior to the limbus in phakic patients. Injection time: 4:00 PM.  Patient tolerated procedure well. There were no complications. The eye was irrigated with sterile irrigating solution. Post procedure instructions given. The patient was instructed to use Artificial Tears q.i.d. p.r.n for comfort. The patient was instructed to return for re-evaluation in approximately 4-12 weeks depending on his/her condition and was told to call immediately if vision decreases and/or if his/her eye becomes red, painful, and/or light sensitive. The patient was instructed to go to the emergency room or call 911 if unable to reach the doctor within an hour or two of trying or calling. Can Vera

## 2021-12-13 ENCOUNTER — IMPORTED ENCOUNTER (OUTPATIENT)
Dept: URBAN - NONMETROPOLITAN AREA CLINIC 1 | Facility: CLINIC | Age: 76
End: 2021-12-13

## 2021-12-13 PROCEDURE — 92015 DETERMINE REFRACTIVE STATE: CPT

## 2021-12-13 PROCEDURE — 92014 COMPRE OPH EXAM EST PT 1/>: CPT

## 2021-12-13 PROCEDURE — 92134 CPTRZ OPH DX IMG PST SGM RTA: CPT

## 2021-12-13 NOTE — PATIENT DISCUSSION
Type II DM w/ mild Retinopathy-  discussed findings with pt -  mild diabetic retinopathy noted OU-  pt diagnosed in 2005 condition is controlled with oral medication-  A1C checked until next week number unknown-  BS-not every day last checked every other day 147 fasting-  will send notes to Dr. Linette Ramos-  continue to monitor yearly or prnMild ARMD OU-  discussed findings /patient-  no changes noted at this time-  need to establish baseline testing at next visit-  RTC 1 year w/OCT Mac or prnP/C IOL w/ PCO OD-  discussed findings with pt -  PCO noted OD open OS-  no treatment indicated at this time patient is not having any symptoms -  continue to monitor year or prnDES OU-  discussed findings w/patient-  no changes noted at this time-  continue AT's at least BID OU-  RTC 1 year or prn; 's Notes: MR Deferred 12/10/2020DFE 12/10/2020

## 2022-01-14 NOTE — PATIENT DISCUSSION
DUREZOL TID - MOD EDEMA - INCREASING - PT WORIED ABOUT OZURDEX GIVEN REAXN SHE HAD - TO SEE HOW [address].

## 2022-01-14 NOTE — PROCEDURE NOTE: CLINICAL
PROCEDURE NOTE: Eyevelyna PFS OD. Diagnosis: Endophthalmitis. Anesthesia: Lidocaine 4%. Prep: Betadine Drops. Prior to injection, risks/benefits/alternatives discussed including but not limited to infection, loss of vision or eye, hemorrhage, cataract, glaucoma, retinal tears or detachment. The patient wished to proceed with treatment. Betadine prep was performed. Topical anesthesia was induced with Alcaine. Additional anesthesia was achieved using drop(s) or injection checked above. A drop of Povidone-iodine 5% ophthalmic solution was instilled over the injection site and in the inferior fornix. A single use prefilled syringe of intravitreal Eylea 2mg/0.05ml was used and excess was disposed of as waste. The needle was passed 3.0 mm posterior to the limbus in pseudophakic patients, and 3.5 mm posterior to the limbus in phakic patients. Injection time: 3:30 PM.  Patient tolerated procedure well. There were no complications. The eye was irrigated with sterile irrigating solution. Post procedure instructions given. The patient was instructed to use Artificial Tears q.i.d. p.r.n for comfort. The patient was instructed to return for re-evaluation in approximately 4-12 weeks depending on his/her condition and was told to call immediately if vision decreases and/or if his/her eye becomes red, painful, and/or light sensitive. The patient was instructed to go to the emergency room or call 911 if unable to reach the doctor within an hour or two of trying or calling. Karime White

## 2022-02-16 NOTE — PROCEDURE NOTE: CLINICAL
PROCEDURE NOTE: Eyevelyna PFS OD. Diagnosis: Endophthalmitis. Anesthesia: Lidocaine 4%. Prep: Betadine Drops. Prior to injection, risks/benefits/alternatives discussed including but not limited to infection, loss of vision or eye, hemorrhage, cataract, glaucoma, retinal tears or detachment. The patient wished to proceed with treatment. Betadine prep was performed. Topical anesthesia was induced with Alcaine. Additional anesthesia was achieved using drop(s) or injection checked above. A drop of Povidone-iodine 5% ophthalmic solution was instilled over the injection site and in the inferior fornix. A single use prefilled syringe of intravitreal Eylea 2mg/0.05ml was used and excess was disposed of as waste. The needle was passed 3.0 mm posterior to the limbus in pseudophakic patients, and 3.5 mm posterior to the limbus in phakic patients. Injection time: 10:45AM.  Patient tolerated procedure well. There were no complications. The eye was irrigated with sterile irrigating solution. Post procedure instructions given. The patient was instructed to use Artificial Tears q.i.d. p.r.n for comfort. The patient was instructed to return for re-evaluation in approximately 4-12 weeks depending on his/her condition and was told to call immediately if vision decreases and/or if his/her eye becomes red, painful, and/or light sensitive. The patient was instructed to go to the emergency room or call 911 if unable to reach the doctor within an hour or two of trying or calling. Vaishnavi Klein

## 2022-02-16 NOTE — PATIENT DISCUSSION
DUREZOL TID - MOD EDEMA - INCREASING - PT WORIED ABOUT OZURDEX GIVEN REAXN SHE HAD - TO SEE HOW [address].

## 2022-03-25 NOTE — PROCEDURE NOTE: CLINICAL
PROCEDURE NOTE: Eylea PFS OD. Diagnosis: Branch Retinal Vein Occlusion with Macular Edema. Anesthesia: Subconjunctival. Prep: Betadine Drops. Prior to injection, risks/benefits/alternatives discussed including but not limited to infection, loss of vision or eye, hemorrhage, cataract, glaucoma, retinal tears or detachment. The patient wished to proceed with treatment. Betadine prep was performed. Topical anesthesia was induced with Alcaine. Additional anesthesia was achieved using drop(s) or injection checked above. A drop of Povidone-iodine 5% ophthalmic solution was instilled over the injection site and in the inferior fornix. A single use prefilled syringe of intravitreal Eylea 2mg/0.05ml was used and excess was disposed of as waste. The needle was passed 3.0 mm posterior to the limbus in pseudophakic patients, and 3.5 mm posterior to the limbus in phakic patients. Injection time: 1:30PM.  Patient tolerated procedure well. There were no complications. The eye was irrigated with sterile irrigating solution. Post procedure instructions given. The patient was instructed to use Artificial Tears q.i.d. p.r.n for comfort. The patient was instructed to return for re-evaluation in approximately 4-12 weeks depending on his/her condition and was told to call immediately if vision decreases and/or if his/her eye becomes red, painful, and/or light sensitive. The patient was instructed to go to the emergency room or call 911 if unable to reach the doctor within an hour or two of trying or calling. Sobeida Ball

## 2022-03-25 NOTE — PATIENT DISCUSSION
DUREZOL TID - MOD EDEMA - INCREASING - PT WORIED ABOUT OZURDEX GIVEN REAXN SHE HAD - TO SEE HOW [address].

## 2022-04-10 ASSESSMENT — VISUAL ACUITY
OU_SC: J2
OS_CC: 20/20
OD_CC: 20/20-2
OU_CC: 20/20
OS_CC: 20/20-2
OD_CC: 20/25-3
OS_CC: 20/25-2
OD_SC: 20/20
OS_SC: 20/20
OU_SC: 20/20
OD_CC: 20/20

## 2022-04-10 ASSESSMENT — TONOMETRY
OD_IOP_MMHG: 20
OD_IOP_MMHG: 20
OS_IOP_MMHG: 20
OS_IOP_MMHG: 19
OS_IOP_MMHG: 19
OD_IOP_MMHG: 18

## 2022-04-29 NOTE — PROCEDURE NOTE: CLINICAL
PROCEDURE NOTE: Eylea PFS OD. Diagnosis: Branch Retinal Vein Occlusion with Macular Edema. Anesthesia: Subconjunctival. Prep: Betadine Drops. Prior to injection, risks/benefits/alternatives discussed including but not limited to infection, loss of vision or eye, hemorrhage, cataract, glaucoma, retinal tears or detachment. The patient wished to proceed with treatment. Betadine prep was performed. Topical anesthesia was induced with Alcaine. Additional anesthesia was achieved using drop(s) or injection checked above. A drop of Povidone-iodine 5% ophthalmic solution was instilled over the injection site and in the inferior fornix. A single use prefilled syringe of intravitreal Eylea 2mg/0.05ml was used and excess was disposed of as waste. The needle was passed 3.0 mm posterior to the limbus in pseudophakic patients, and 3.5 mm posterior to the limbus in phakic patients. Injection time: 3:30PM.  Patient tolerated procedure well. There were no complications. The eye was irrigated with sterile irrigating solution. Post procedure instructions given. The patient was instructed to use Artificial Tears q.i.d. p.r.n for comfort. The patient was instructed to return for re-evaluation in approximately 4-12 weeks depending on his/her condition and was told to call immediately if vision decreases and/or if his/her eye becomes red, painful, and/or light sensitive. The patient was instructed to go to the emergency room or call 911 if unable to reach the doctor within an hour or two of trying or calling. Star Reid

## 2022-04-29 NOTE — PATIENT DISCUSSION
DUREZOL TID - MOD EDEMA - INCREASING - PT WORIED ABOUT OZURDEX GIVEN REAXN SHE HAD - TO SEE HOW [address].

## 2022-06-03 NOTE — PATIENT DISCUSSION
11 16 16 LESS VA, MILD EDEMA, RECOM RETX OZURDEX GIVEN DROP IN VA, AND IOP IN 4 WKS, THEN REEVAL 2 MONTHS LATER. - - -

## 2022-06-03 NOTE — PROCEDURE NOTE: CLINICAL
PROCEDURE NOTE: Eylea PFS OD. Diagnosis: Branch Retinal Vein Occlusion with Macular Edema. Anesthesia: Topical/Subconjunctival. Prep: Betadine Drops. Prior to injection, risks/benefits/alternatives discussed including but not limited to infection, loss of vision or eye, hemorrhage, cataract, glaucoma, retinal tears or detachment. The patient wished to proceed with treatment. Betadine prep was performed. Topical anesthesia was induced with Alcaine. Additional anesthesia was achieved using drop(s) or injection checked above. A drop of Povidone-iodine 5% ophthalmic solution was instilled over the injection site and in the inferior fornix. A single use prefilled syringe of intravitreal Eylea 2mg/0.05ml was used and excess was disposed of as waste. The needle was passed 3.0 mm posterior to the limbus in pseudophakic patients, and 3.5 mm posterior to the limbus in phakic patients. Injection time: 11:45AM.  Patient tolerated procedure well. There were no complications. The eye was irrigated with sterile irrigating solution. Post procedure instructions given. The patient was instructed to use Artificial Tears q.i.d. p.r.n for comfort. The patient was instructed to return for re-evaluation in approximately 4-12 weeks depending on his/her condition and was told to call immediately if vision decreases and/or if his/her eye becomes red, painful, and/or light sensitive. The patient was instructed to go to the emergency room or call 911 if unable to reach the doctor within an hour or two of trying or calling. Shayne Collazo

## 2022-07-08 NOTE — PROCEDURE NOTE: CLINICAL
PROCEDURE NOTE: Eylea PFS OD. Diagnosis: Branch Retinal Vein Occlusion with Macular Edema. Anesthesia: Subconjunctival. Prep: Betadine Drops. Prior to injection, risks/benefits/alternatives discussed including but not limited to infection, loss of vision or eye, hemorrhage, cataract, glaucoma, retinal tears or detachment. The patient wished to proceed with treatment. Betadine prep was performed. Topical anesthesia was induced with Alcaine. Additional anesthesia was achieved using drop(s) or injection checked above. A drop of Povidone-iodine 5% ophthalmic solution was instilled over the injection site and in the inferior fornix. A single use prefilled syringe of intravitreal Eylea 2mg/0.05ml was used and excess was disposed of as waste. The needle was passed 3.0 mm posterior to the limbus in pseudophakic patients, and 3.5 mm posterior to the limbus in phakic patients. Injection time: 10:45AM.  Patient tolerated procedure well. There were no complications. The eye was irrigated with sterile irrigating solution. Post procedure instructions given. The patient was instructed to use Artificial Tears q.i.d. p.r.n for comfort. The patient was instructed to return for re-evaluation in approximately 4-12 weeks depending on his/her condition and was told to call immediately if vision decreases and/or if his/her eye becomes red, painful, and/or light sensitive. The patient was instructed to go to the emergency room or call 911 if unable to reach the doctor within an hour or two of trying or calling. Star Reid

## 2022-07-08 NOTE — PATIENT DISCUSSION
DUREZOL TID - MOD EDEMA - INCREASING - PT WORIED ABOUT OZURDEX GIVEN REAXN SHE HAD - TO SEE HOW [address].

## 2022-08-12 NOTE — PATIENT DISCUSSION
DUREZOL TID - MOD EDEMA - INCREASING - PT WORIED ABOUT OZURDEX GIVEN REAXN SHE HAD - TO SEE HOW [address].

## 2022-08-12 NOTE — PATIENT DISCUSSION
8 12 22 CONTINUES ON PLAQUENIL - HIGH RISK - OVER 1000 GRAM TOTAL DOSE - OVER 5 MG/KG (125 LBS)  = 283 MG/DAY.  NO SIGN TOXICITY - CONT TO MONITOR.

## 2022-08-12 NOTE — PROCEDURE NOTE: CLINICAL
PROCEDURE NOTE: Eylea PFS OD. Diagnosis: Branch Retinal Vein Occlusion with Macular Edema. Anesthesia: Subconjunctival. Prep: Betadine Drops. Prior to injection, risks/benefits/alternatives discussed including but not limited to infection, loss of vision or eye, hemorrhage, cataract, glaucoma, retinal tears or detachment. The patient wished to proceed with treatment. Betadine prep was performed. Topical anesthesia was induced with Alcaine. Additional anesthesia was achieved using drop(s) or injection checked above. A drop of Povidone-iodine 5% ophthalmic solution was instilled over the injection site and in the inferior fornix. A single use prefilled syringe of intravitreal Eylea 2mg/0.05ml was used and excess was disposed of as waste. The needle was passed 3.0 mm posterior to the limbus in pseudophakic patients, and 3.5 mm posterior to the limbus in phakic patients. Injection time: 1115AM.  Patient tolerated procedure well. There were no complications. The eye was irrigated with sterile irrigating solution. Post procedure instructions given. The patient was instructed to use Artificial Tears q.i.d. p.r.n for comfort. The patient was instructed to return for re-evaluation in approximately 4-12 weeks depending on his/her condition and was told to call immediately if vision decreases and/or if his/her eye becomes red, painful, and/or light sensitive. The patient was instructed to go to the emergency room or call 911 if unable to reach the doctor within an hour or two of trying or calling. Shayne Collazo

## 2022-08-29 NOTE — PATIENT DISCUSSION
DUREZOL TID - MOD EDEMA - INCREASING - PT WORIED ABOUT OZURDEX GIVEN REAXN SHE HAD - TO SEE HOW [address]. Patient returned your call.

## 2022-09-23 NOTE — PATIENT DISCUSSION
DUREZOL TID - MOD EDEMA - INCREASING - PT WORIED ABOUT OZURDEX GIVEN REAXN SHE HAD - TO SEE HOW [address].

## 2022-09-23 NOTE — PROCEDURE NOTE: CLINICAL
PROCEDURE NOTE: Eylea PFS OD. Diagnosis: Branch Retinal Vein Occlusion with Macular Edema. Anesthesia: Subconjunctival. Prep: Betadine Drops. Prior to injection, risks/benefits/alternatives discussed including but not limited to infection, loss of vision or eye, hemorrhage, cataract, glaucoma, retinal tears or detachment. The patient wished to proceed with treatment. Betadine prep was performed. Topical anesthesia was induced with Alcaine. Additional anesthesia was achieved using drop(s) or injection checked above. A drop of Povidone-iodine 5% ophthalmic solution was instilled over the injection site and in the inferior fornix. A single use prefilled syringe of intravitreal Eylea 2mg/0.05ml was used and excess was disposed of as waste. The needle was passed 3.0 mm posterior to the limbus in pseudophakic patients, and 3.5 mm posterior to the limbus in phakic patients. Injection time: 3:30PM.  Patient tolerated procedure well. There were no complications. The eye was irrigated with sterile irrigating solution. Post procedure instructions given. The patient was instructed to use Artificial Tears q.i.d. p.r.n for comfort. The patient was instructed to return for re-evaluation in approximately 4-12 weeks depending on his/her condition and was told to call immediately if vision decreases and/or if his/her eye becomes red, painful, and/or light sensitive. The patient was instructed to go to the emergency room or call 911 if unable to reach the doctor within an hour or two of trying or calling. Spencer Arguelles

## 2022-11-04 NOTE — PROCEDURE NOTE: CLINICAL
PROCEDURE NOTE: Eylea PFS OD. Diagnosis: Branch Retinal Vein Occlusion with Macular Edema. Anesthesia: Subconjunctival. Prep: Betadine Drops. Prior to injection, risks/benefits/alternatives discussed including but not limited to infection, loss of vision or eye, hemorrhage, cataract, glaucoma, retinal tears or detachment. The patient wished to proceed with treatment. Betadine prep was performed. Topical anesthesia was induced with Alcaine. Additional anesthesia was achieved using drop(s) or injection checked above. A drop of Povidone-iodine 5% ophthalmic solution was instilled over the injection site and in the inferior fornix. A single use prefilled syringe of intravitreal Eylea 2mg/0.05ml was used and excess was disposed of as waste. The needle was passed 3.0 mm posterior to the limbus in pseudophakic patients, and 3.5 mm posterior to the limbus in phakic patients. Injection time: *. Patient tolerated procedure well. There were no complications. The eye was irrigated with sterile irrigating solution. Post procedure instructions given. The patient was instructed to use Artificial Tears q.i.d. p.r.n for comfort. The patient was instructed to return for re-evaluation in approximately 4-12 weeks depending on his/her condition and was told to call immediately if vision decreases and/or if his/her eye becomes red, painful, and/or light sensitive. The patient was instructed to go to the emergency room or call 911 if unable to reach the doctor within an hour or two of trying or calling. Chaz Panchal

## 2022-11-04 NOTE — PATIENT DISCUSSION
DUREZOL TID - MOD EDEMA - INCREASING - PT WORIED ABOUT OZURDEX GIVEN REAXN SHE HAD - TO SEE HOW [address]

## 2022-12-16 NOTE — PATIENT DISCUSSION
DUREZOL TID - MOD EDEMA - INCREASING - PT WORIED ABOUT OZURDEX GIVEN REAXN SHE HAD - TO SEE HOW [address]

## 2022-12-16 NOTE — PROCEDURE NOTE: CLINICAL
PROCEDURE NOTE: Eylea PFS OD. Diagnosis: Branch Retinal Vein Occlusion with Macular Edema. Anesthesia: Subconjunctival. Prep: Betadine Drops. Prior to injection, risks/benefits/alternatives discussed including but not limited to infection, loss of vision or eye, hemorrhage, cataract, glaucoma, retinal tears or detachment. The patient wished to proceed with treatment. Betadine prep was performed. Topical anesthesia was induced with Alcaine. Additional anesthesia was achieved using drop(s) or injection checked above. A drop of Povidone-iodine 5% ophthalmic solution was instilled over the injection site and in the inferior fornix. A single use prefilled syringe of intravitreal Eylea 2mg/0.05ml was used and excess was disposed of as waste. The needle was passed 3.0 mm posterior to the limbus in pseudophakic patients, and 3.5 mm posterior to the limbus in phakic patients. Injection time: 3:32. Patient tolerated procedure well. There were no complications. The eye was irrigated with sterile irrigating solution. Post procedure instructions given. The patient was instructed to use Artificial Tears q.i.d. p.r.n for comfort. The patient was instructed to return for re-evaluation in approximately 4-12 weeks depending on his/her condition and was told to call immediately if vision decreases and/or if his/her eye becomes red, painful, and/or light sensitive. The patient was instructed to go to the emergency room or call 911 if unable to reach the doctor within an hour or two of trying or calling. Spencer Arguelles

## 2023-01-27 NOTE — PATIENT DISCUSSION
DUREZOL TID - MOD EDEMA - INCREASING - PT WORIED ABOUT OZURDEX GIVEN REAXN SHE HAD - TO SEE HOW [address]

## 2023-01-27 NOTE — PATIENT DISCUSSION
[address] [facility] - AND CONT DUREZOL. <<----- Click to add NO pertinent Past Medical History No pertinent past medical history

## 2023-01-27 NOTE — PROCEDURE NOTE: CLINICAL
PROCEDURE NOTE: Eylea PFS OD. Diagnosis: Branch Retinal Vein Occlusion with Macular Edema. Anesthesia: Lidocaine 4%. Prep: Betadine Drops. Prior to injection, risks/benefits/alternatives discussed including but not limited to infection, loss of vision or eye, hemorrhage, cataract, glaucoma, retinal tears or detachment. The patient wished to proceed with treatment. Betadine prep was performed. Topical anesthesia was induced with Alcaine. Additional anesthesia was achieved using drop(s) or injection checked above. A drop of Povidone-iodine 5% ophthalmic solution was instilled over the injection site and in the inferior fornix. A single use prefilled syringe of intravitreal Eylea 2mg/0.05ml was used and excess was disposed of as waste. The needle was passed 3.0 mm posterior to the limbus in pseudophakic patients, and 3.5 mm posterior to the limbus in phakic patients. Injection time: 12:55PM.  Patient tolerated procedure well. There were no complications. The eye was irrigated with sterile irrigating solution. Post procedure instructions given. The patient was instructed to use Artificial Tears q.i.d. p.r.n for comfort. The patient was instructed to return for re-evaluation in approximately 4-12 weeks depending on his/her condition and was told to call immediately if vision decreases and/or if his/her eye becomes red, painful, and/or light sensitive. The patient was instructed to go to the emergency room or call 911 if unable to reach the doctor within an hour or two of trying or calling. Samia Watson

## 2023-01-31 ENCOUNTER — COMPREHENSIVE EXAM (OUTPATIENT)
Dept: URBAN - NONMETROPOLITAN AREA CLINIC 4 | Facility: CLINIC | Age: 78
End: 2023-01-31

## 2023-01-31 DIAGNOSIS — H52.12: ICD-10-CM

## 2023-01-31 DIAGNOSIS — H52.223: ICD-10-CM

## 2023-01-31 DIAGNOSIS — E11.3293: ICD-10-CM

## 2023-01-31 DIAGNOSIS — H52.4: ICD-10-CM

## 2023-01-31 DIAGNOSIS — H35.3131: ICD-10-CM

## 2023-01-31 DIAGNOSIS — H52.01: ICD-10-CM

## 2023-01-31 DIAGNOSIS — H26.491: ICD-10-CM

## 2023-01-31 PROCEDURE — 92015 DETERMINE REFRACTIVE STATE: CPT

## 2023-01-31 PROCEDURE — 92134 CPTRZ OPH DX IMG PST SGM RTA: CPT

## 2023-01-31 PROCEDURE — 92014 COMPRE OPH EXAM EST PT 1/>: CPT

## 2023-01-31 ASSESSMENT — VISUAL ACUITY
OS_SC: 20/30-2
OD_SC: 20/25

## 2023-01-31 ASSESSMENT — TONOMETRY
OS_IOP_MMHG: 16
OD_IOP_MMHG: 16

## 2023-03-15 NOTE — PATIENT DISCUSSION
P  Medicare Wellness Visit  Plan for Preventive Care    A good way for you to stay healthy is to use preventive care.  Medicare covers many services that can help you stay healthy.* The goal of these services is to find any health problems as quickly as possible. Finding problems early can help make them easier to treat.  Your personal plan below lists the services you may need and when they are due.      Health Maintenance Summary       DTaP/Tdap/Td Vaccine (1 - Tdap)  Overdue since 9/6/2007    Medicare Advantage- Medicare Wellness Visit (Yearly - January to December)  Due since 1/1/2023    Depression Screening (Yearly)  Due since 3/14/2023    Pneumococcal Vaccine 65+   Completed    Shingles Vaccine   Completed    Influenza Vaccine   Completed    COVID-19 Vaccine   Completed    Meningococcal Vaccine   Aged Out    Hepatitis B Vaccine (For Physician/APC Discussion)   Aged Out    HPV Vaccine   Aged Out             Preventive Care for Women and Men    Heart Screenings (Cardiovascular):  Blood tests are used to check your cholesterol, lipid and triglyceride levels. High levels can increase your risk for heart disease and stroke. High levels can be treated with medications, diet and exercise. Lowering your levels can help keep your heart and blood vessels healthy.  Your provider will order these tests if they are needed.    An ultrasound is done to see if you have an abdominal aortic aneurysm (AAA).  This is an enlargement of one of the main blood vessels that delivers blood to the body.   In the [place], 9,000 deaths are caused by AAA.  You may not even know you have this problem and as many as 1 in 3 people will have a serious problem if it is not treated.  Early diagnosis allows for more effective treatment and cure.  If you have a family history of AAA or are a male age 65-75 who has smoked, you are at higher risk of an AAA.  Your provider can order this test, if needed.    Colorectal Screening:  There are many  Recommended OBSERVATION and MONITORING for change. tests that are used to check for cancer of your colon and rectum. You and your provider should discuss what test is best for you and when to have it done.  Options include:  Screening Colonoscopy: exam of the entire colon, seen through a flexible lighted tube.  Flexible Sigmoidoscopy: exam of the last third (sigmoid portion) of the colon and rectum, seen through a flexible lighted tube.  Cologuard DNA stool test: a sample of your stool is used to screen for cancer and unseen blood in your stool.  Fecal Occult Blood Test: a sample of your stool is studied to find any unseen blood    Flu Shot:  An immunization that helps to prevent influenza (the flu). You should get this every year. The best time to get the shot is in the fall.    Pneumococcal Shot:  Vaccines help prevent pneumococcal disease, which is any type of illness caused by Streptococcus pneumoniae bacteria. There are two kinds of pneumococcal vaccines available in the [place]:   Pneumococcal conjugate vaccines (PCV20 or Ytenitw94®)  Pneumococcal polysaccharide vaccine (PPSV23 or Vshrahgqp86®)  For those who have never received any pneumococcal conjugate vaccine, CDC recommends PVC20 for adults 65 years or older and adults 19 through 64 years old with certain medical conditions or risk factors.   For those who have previously received PCV13, this should be followed by a dose of PPSV23.     Hepatitis B Shot:  An immunization that helps to protect people from getting Hepatitis B. Hepatitis B is a virus that spreads through contact with infected blood or body fluids. Many people with the virus do not have symptoms.  The virus can lead to serious problems, such as liver disease. Some people are at higher risk than others. Your doctor will tell you if you need this shot.     Diabetes Screening:  A test to measure sugar (glucose) in your blood is called a fasting blood sugar. Fasting means you cannot have food or drink for at least 8 hours before the test.  This test can detect diabetes long before you may notice symptoms.    Glaucoma Screening:  Glaucoma screening is performed by your eye doctor. The test measures the fluid pressure inside your eyes to determine if you have glaucoma.     Hepatitis C Screening:  A blood test to see if you have the hepatitis C virus.  Hepatitis C attacks the liver and is a major cause of chronic liver disease.  Medicare will cover a single screening for all adults born between 1945 & 1965, or high risk patients (people who have injected illegal drugs or people who have had blood transfusions).  High risk patients who continue to inject illegal drugs can be screened for Hepatitis C every year.    Smoking and Tobacco-Use Cessation Counseling:  Tobacco is the single greatest cause of disease and early death in our country today. Medication and counseling together can increase a person’s chance of quitting for good.   Medicare covers two quitting attempts per year, with four counseling sessions per attempt (eight sessions in a 12 month period)    Preventive Screening tests for Women    Screening Mammograms and Breast Exams:  An x-ray of your breasts to check for breast cancer before you or your doctor may be able to feel it.  If breast cancer is found early it can usually be treated with success.    Pelvic Exams and Pap Tests:  An exam to check for cervical and vaginal cancer. A Pap test is a lab test in which cells are taken from your cervix and sent to the lab to look for signs of cervical cancer. If cancer of the cervix is found early, chances for a cure are good. Testing can generally end at age 65, or if a woman has a hysterectomy for a benign condition. Your provider may recommend more frequent testing if certain abnormal results are found.    Bone Mass Measurements:  A painless x-ray of your bone density to see if you are at risk for a broken bone. Bone density refers to the thickness of bones or how tightly the bone tissue is  packed.    Preventive Screening tests for Men    Prostate Screening:  Should you have a prostate cancer test (PSA)?  It is up to you to decide if you want a prostate cancer test. Talk to your clinician to find out if the test is right for you.  Things for you to consider and talk about should include:  Benefits and harms of the test  Your family history  How your race/ethnicity may influence the test  If the test may impact other medical conditions you have  Your values on screenings and treatments    *Medicare pays for many preventive services to keep you healthy. For some of these services, you might have to pay a deductible, coinsurance, and / or copayment.  The amounts vary depending on the type of services you need and the kind of Medicare health plan you have.    For further details on screenings offered by Medicare please visit: https://www.medicare.gov/coverage/preventive-screening-services     Increase Sanctura 40 mg daily     PT consult for leg strenth/unsteadiness/risk of falls     Follow up on Friday 3/17 at 8 AM for memory clinic  Medicare Wellness Visit  Plan for Preventive Care    A good way for you to stay healthy is to use preventive care.  Medicare covers many services that can help you stay healthy.* The goal of these services is to find any health problems as quickly as possible. Finding problems early can help make them easier to treat.  Your personal plan below lists the services you may need and when they are due.      Health Maintenance Summary       DTaP/Tdap/Td Vaccine (1 - Tdap)  Overdue since 9/6/2007    Medicare Advantage- Medicare Wellness Visit (Yearly - January to December)  Due since 1/1/2023    Depression Screening (Yearly)  Next due on 3/15/2024    Pneumococcal Vaccine 65+   Completed    Shingles Vaccine   Completed    Influenza Vaccine   Completed    COVID-19 Vaccine   Completed    Meningococcal Vaccine   Aged Out    Hepatitis B Vaccine (For Physician/APC Discussion)   Aged Out     HPV Vaccine   Aged Out             Preventive Care for Women and Men    Heart Screenings (Cardiovascular):  Blood tests are used to check your cholesterol, lipid and triglyceride levels. High levels can increase your risk for heart disease and stroke. High levels can be treated with medications, diet and exercise. Lowering your levels can help keep your heart and blood vessels healthy.  Your provider will order these tests if they are needed.    An ultrasound is done to see if you have an abdominal aortic aneurysm (AAA).  This is an enlargement of one of the main blood vessels that delivers blood to the body.   In the [place], 9,000 deaths are caused by AAA.  You may not even know you have this problem and as many as 1 in 3 people will have a serious problem if it is not treated.  Early diagnosis allows for more effective treatment and cure.  If you have a family history of AAA or are a male age 65-75 who has smoked, you are at higher risk of an AAA.  Your provider can order this test, if needed.    Colorectal Screening:  There are many tests that are used to check for cancer of your colon and rectum. You and your provider should discuss what test is best for you and when to have it done.  Options include:  Screening Colonoscopy: exam of the entire colon, seen through a flexible lighted tube.  Flexible Sigmoidoscopy: exam of the last third (sigmoid portion) of the colon and rectum, seen through a flexible lighted tube.  Cologuard DNA stool test: a sample of your stool is used to screen for cancer and unseen blood in your stool.  Fecal Occult Blood Test: a sample of your stool is studied to find any unseen blood    Flu Shot:  An immunization that helps to prevent influenza (the flu). You should get this every year. The best time to get the shot is in the fall.    Pneumococcal Shot:  Vaccines help prevent pneumococcal disease, which is any type of illness caused by Streptococcus pneumoniae bacteria. There are  two kinds of pneumococcal vaccines available in the [place]:   Pneumococcal conjugate vaccines (PCV20 or Ykatasl69®)  Pneumococcal polysaccharide vaccine (PPSV23 or [id number]®)  For those who have never received any pneumococcal conjugate vaccine, CDC recommends PVC20 for adults 65 years or older and adults 19 through 64 years old with certain medical conditions or risk factors.   For those who have previously received PCV13, this should be followed by a dose of PPSV23.     Hepatitis B Shot:  An immunization that helps to protect people from getting Hepatitis B. Hepatitis B is a virus that spreads through contact with infected blood or body fluids. Many people with the virus do not have symptoms.  The virus can lead to serious problems, such as liver disease. Some people are at higher risk than others. Your doctor will tell you if you need this shot.     Diabetes Screening:  A test to measure sugar (glucose) in your blood is called a fasting blood sugar. Fasting means you cannot have food or drink for at least 8 hours before the test. This test can detect diabetes long before you may notice symptoms.    Glaucoma Screening:  Glaucoma screening is performed by your eye doctor. The test measures the fluid pressure inside your eyes to determine if you have glaucoma.     Hepatitis C Screening:  A blood test to see if you have the hepatitis C virus.  Hepatitis C attacks the liver and is a major cause of chronic liver disease.  Medicare will cover a single screening for all adults born between 1945 & 1965, or high risk patients (people who have injected illegal drugs or people who have had blood transfusions).  High risk patients who continue to inject illegal drugs can be screened for Hepatitis C every year.    Smoking and Tobacco-Use Cessation Counseling:  Tobacco is the single greatest cause of disease and early death in our country today. Medication and counseling together can increase a person’s chance of  quitting for good.   Medicare covers two quitting attempts per year, with four counseling sessions per attempt (eight sessions in a 12 month period)    Preventive Screening tests for Women    Screening Mammograms and Breast Exams:  An x-ray of your breasts to check for breast cancer before you or your doctor may be able to feel it.  If breast cancer is found early it can usually be treated with success.    Pelvic Exams and Pap Tests:  An exam to check for cervical and vaginal cancer. A Pap test is a lab test in which cells are taken from your cervix and sent to the lab to look for signs of cervical cancer. If cancer of the cervix is found early, chances for a cure are good. Testing can generally end at age 65, or if a woman has a hysterectomy for a benign condition. Your provider may recommend more frequent testing if certain abnormal results are found.    Bone Mass Measurements:  A painless x-ray of your bone density to see if you are at risk for a broken bone. Bone density refers to the thickness of bones or how tightly the bone tissue is packed.    Preventive Screening tests for Men    Prostate Screening:  Should you have a prostate cancer test (PSA)?  It is up to you to decide if you want a prostate cancer test. Talk to your clinician to find out if the test is right for you.  Things for you to consider and talk about should include:  Benefits and harms of the test  Your family history  How your race/ethnicity may influence the test  If the test may impact other medical conditions you have  Your values on screenings and treatments    *Medicare pays for many preventive services to keep you healthy. For some of these services, you might have to pay a deductible, coinsurance, and / or copayment.  The amounts vary depending on the type of services you need and the kind of Medicare health plan you have.    For further details on screenings offered by Medicare please visit:  https://www.medicare.gov/coverage/preventive-screening-services

## 2023-06-07 NOTE — PATIENT DISCUSSION
No retinal tears or retinal detachment seen on clinical exam today. Reviewed the signs and symptoms of retinal tear/retinal detachment and the importance of calling for prompt evaluation should there be increasing floaters, new flashing lights, or decreasing peripheral vision in either eye at any time. Observation recommended. Neurological:  Alert and oriented times 3. No focal neuro deficits. Psychiatric:  Appropriate    I have reviewed and interpreted all of the currently available lab results from this visit (if applicable):  No results found for this visit on 06/07/23. Radiographs (if obtained):  Radiologist's Report Reviewed:  No results found. MDM:    26-year-old male presenting with history seen above. Patient is afebrile satting 1 room air. Physical exam can be seen above. There is no signs of peritonsillar abscess, retropharyngeal abscess, submandibular abscess, epiglottitis. There is no drainable dental abscess. There is no significant swelling or erythema. Patient is allergic to ibuprofen. I discussed Tylenol at home and will start on Augmentin with close follow-up with dentistry. I gave patient dentistry referral list.  I discussed that I would like him to also follow-up with his primary doctor in 2 days for reevaluation. I discussed strict return precautions with patient as well as close follow-up and patient agreeable to plan and discharged home. Clinical Impression:  1. Pain, dental          Comment: Please note this report has been produced using speech recognition software and may contain errors related to that system including errors in grammar, punctuation, and spelling, as well as words and phrases that may be inappropriate. Efforts were made to edit the dictations.         Antony Talavera MD  06/07/23 [id number]

## 2023-07-09 NOTE — PATIENT DISCUSSION
The patient is at high risk for a choroidal neovascular membrane. NO CNV SEEN TODAY. Dry ARMD is responsible for some decrease in vision. 09-Jul-2023 14:04

## 2023-10-09 NOTE — PATIENT DISCUSSION
My findings and recommendations are based on the patient's symptoms, exam, diagnostic testing and records. Pinch Graft Text: The defect edges were debeveled with a #15 scalpel blade. Given the location of the defect, shape of the defect and the proximity to free margins a pinch graft was deemed most appropriate. Using a sterile surgical marker, the primary defect shape was transferred to the donor site. The area thus outlined was incised deep to adipose tissue with a #15 scalpel blade.  The harvested graft was then trimmed of adipose tissue until only dermis and epidermis was left. The skin margins of the secondary defect were undermined to an appropriate distance in all directions utilizing iris scissors.  The secondary defect was closed with interrupted buried subcutaneous sutures.  The skin edges were then re-apposed with running  sutures.  The skin graft was then placed in the primary defect and oriented appropriately.

## 2024-01-31 NOTE — PATIENT DISCUSSION
Direct admit from [facility].  Pt AOx4. Not in distress. Complains of HA. No dizziness at this time. Pt updated on plan of care.    Problem: Diabetes  Goal: Achieves glycemic balance  Description: Goal is to maintain blood sugar within range with no episodes of hypoglycemia  1/31/2024 0422 by Angela Heart RN  Outcome: Monitoring/Evaluating progress  1/31/2024 0421 by Angela Heart RN  Outcome: Monitoring/Evaluating progress  Goal: Verbalizes/demonstrates understanding of NEW diagnosis of diabetes and management  Description: Document on Patient Education Activity  1/31/2024 0422 by Angela Heart RN  Outcome: Monitoring/Evaluating progress  1/31/2024 0421 by Angela Heart RN  Outcome: Monitoring/Evaluating progress  Goal: Verbalizes understanding of diabetes management including how to use HbA1C to evaluate status of blood sugar over time (Diabetes is NOT a new diagnosis)  Description: Diabetes Education  1/31/2024 0422 by Angela Heart RN  Outcome: Monitoring/Evaluating progress  1/31/2024 0421 by Angela Heart RN  Outcome: Monitoring/Evaluating progress  Goal: Demonstrates ability to self-administer insulin  Description: Document on Patient Education Activity  1/31/2024 0422 by Angela Heart RN  Outcome: Monitoring/Evaluating progress  1/31/2024 0421 by Angela Heart RN  Outcome: Monitoring/Evaluating progress     Problem: At Risk for Falls  Goal: Patient does not fall  1/31/2024 0422 by Angela Heart RN  Outcome: Monitoring/Evaluating progress  1/31/2024 0421 by Angela Heart RN  Outcome: Monitoring/Evaluating progress  Goal: Patient takes action to control fall-related risks  1/31/2024 0422 by Angela Heart RN  Outcome: Monitoring/Evaluating progress  1/31/2024 0421 by Angela Heart RN  Outcome: Monitoring/Evaluating progress     Problem: At Risk for Injury Due to Fall  Goal: Patient does not fall  1/31/2024 0422 by Angela Heart RN  Outcome: Monitoring/Evaluating  MONITOR response to TREATMENT. progress  1/31/2024 0421 by Angela Heart RN  Outcome: Monitoring/Evaluating progress  Goal: Takes action to control condition specific risks  1/31/2024 0422 by Angela Heart RN  Outcome: Monitoring/Evaluating progress  1/31/2024 0421 by Angela Heart RN  Outcome: Monitoring/Evaluating progress  Goal: Verbalizes understanding of fall-related injury personal risks  Description: Document education using the patient education activity  1/31/2024 0422 by Angela Heart RN  Outcome: Monitoring/Evaluating progress  1/31/2024 0421 by Angela Heart RN  Outcome: Monitoring/Evaluating progress     Problem: Pain  Goal: Acceptable pain level achieved/maintained at rest using appropriate pain scale for the patient  1/31/2024 0422 by Angela Heart RN  Outcome: Monitoring/Evaluating progress  1/31/2024 0421 by Angela Heart RN  Outcome: Monitoring/Evaluating progress  Goal: Acceptable pain level achieved/maintained with activity using appropriate pain scale for the patient  1/31/2024 0422 by Angela Heart RN  Outcome: Monitoring/Evaluating progress  1/31/2024 0421 by Angela Heart RN  Outcome: Monitoring/Evaluating progress  Goal: Acceptable pain level achieved/maintained without oversedation  1/31/2024 0422 by Angela Heart RN  Outcome: Monitoring/Evaluating progress  1/31/2024 0421 by Angela Heart RN  Outcome: Monitoring/Evaluating progress

## 2024-02-01 ENCOUNTER — COMPREHENSIVE EXAM (OUTPATIENT)
Dept: URBAN - NONMETROPOLITAN AREA CLINIC 4 | Facility: CLINIC | Age: 79
End: 2024-02-01

## 2024-02-01 DIAGNOSIS — H35.3131: ICD-10-CM

## 2024-02-01 DIAGNOSIS — E11.9: ICD-10-CM

## 2024-02-01 DIAGNOSIS — H26.491: ICD-10-CM

## 2024-02-01 PROCEDURE — 92014 COMPRE OPH EXAM EST PT 1/>: CPT

## 2024-02-01 ASSESSMENT — TONOMETRY
OD_IOP_MMHG: 16
OS_IOP_MMHG: 16

## 2024-02-01 ASSESSMENT — VISUAL ACUITY
OS_SC: 20/30
OD_SC: 20/25

## 2025-02-25 ENCOUNTER — COMPREHENSIVE EXAM (OUTPATIENT)
Age: 80
End: 2025-02-25

## 2025-02-25 DIAGNOSIS — H26.491: ICD-10-CM

## 2025-02-25 DIAGNOSIS — H52.12: ICD-10-CM

## 2025-02-25 DIAGNOSIS — E11.9: ICD-10-CM

## 2025-02-25 DIAGNOSIS — H52.4: ICD-10-CM

## 2025-02-25 DIAGNOSIS — H52.223: ICD-10-CM

## 2025-02-25 DIAGNOSIS — H35.3131: ICD-10-CM

## 2025-02-25 DIAGNOSIS — H52.01: ICD-10-CM

## 2025-02-25 PROCEDURE — 92134 CPTRZ OPH DX IMG PST SGM RTA: CPT

## 2025-02-25 PROCEDURE — 99214 OFFICE O/P EST MOD 30 MIN: CPT

## 2025-03-28 NOTE — PATIENT DISCUSSION
Chart reviewed  Received consult to see patient for TF     Patient is well known to our services d/t frequent admissions    She is currently OBS status, therefore a full nutrition assessment will be deferred at this time    Reviewed TCU records and appears she was most recently receiving [employer] Renal at 65 mL/hr x 14 hours (from 1600 to 0600 daily) via existing G-tube = 1638 kcal, 73 g protein, 157 g CHO, 18 g fiber, 600 mL H2O  She was also receiving [employer] down FT for pressure injury   Flushes 60 mL before and after the cycle feed  Diet = Regular with thin liquids    Will re-order [employer] Renal at 65 mL/hr x 14 hours as above  ProSource TF20 once daily (80 kcal and 20 g protein)  Total (TF + ProSource TF20)= 1718 kcal, 93 g protein  Flushes as above   CL diet per MD order       Miguelina Feliciano, RD, LD, CNSC   Clinical Dietitian - [facility]          The patient is at high risk for a choroidal neovascular membrane. NO CNV SEEN TODAY. Dry ARMD is responsible for some decrease in vision.